# Patient Record
(demographics unavailable — no encounter records)

---

## 2025-01-28 NOTE — PHYSICAL EXAM
[Alert] : alert [Normocephalic] : normocephalic [Flat Open Anterior [place]] : flat open anterior fontanelle [PERRL] : PERRL [Red Reflex Bilateral] : red reflex bilateral [Normally Placed Ears] : normally placed ears [Auricles Well Formed] : auricles well formed [Clear Tympanic membranes] : clear tympanic membranes [Light reflex present] : light reflex present [Bony landmarks visible] : bony landmarks visible [Nares Patent] : nares patent [Palate Intact] : palate intact [Uvula Midline] : uvula midline [Supple, full passive range of motion] : supple, full passive range of motion [Symmetric Chest Rise] : symmetric chest rise [Clear to Auscultation Bilaterally] : clear to auscultation bilaterally [Regular Rate and Rhythm] : regular rate and rhythm [S1, S2 present] : S1, S2 present [+2 Femoral Pulses] : +2 femoral pulses [Soft] : soft [Bowel Sounds] : bowel sounds present [Normal external genitailia] : normal external genitalia [Central Urethral Opening] : central urethral opening [Testicles Descended Bilaterally] : testicles descended bilaterally [Patent] : patent [Normally Placed] : normally placed [No Abnormal Lymph Nodes Palpated] : no abnormal lymph nodes palpated [Symmetric Flexed Extremities] : symmetric flexed extremities [Straight] : straight [Startle Reflex] : startle reflex present [Suck Reflex] : suck reflex present [Rooting] : rooting reflex present [Palmar Grasp] : palmar grasp reflex present [Plantar Grasp] : plantar grasp reflex present [Symmetric [place]] : symmetric [place] [Acute Distress] : no acute distress [Icteric sclera] : nonicteric sclera [Discharge] : no discharge [Palpable Masses] : no palpable masses [Murmurs] : no murmurs [Breast Tissue] : no prominent breast tissue [Tender] : nontender [Distended] : not distended [Hepatomegaly] : no hepatomegaly [Splenomegaly] : no splenomegaly [Mcgowan-Ortolani] : negative Mcgowan-Ortolani [Spinal Dimple] : no spinal dimple [Tuft of Hair] : no tuft of hair [Jaundice] : not jaundice [FreeTextEntry1] : alert NAD, no jaundice [FreeTextEntry2] : NCAT - small about 2 cm mass R upper scalp, with small scab on it.  [FreeTextEntry5] : RR ++  [de-identified] : palate intact [FreeTextEntry8] : RR no murmur [FreeTextEntry9] : soft nl, umb cord dry, clean.  [FreeTextEntry6] : nl penis with wrapping on circ. testes desc bilat [de-identified] : Mcgowan/Ortolani normal, Galeazzi test normal.  Leg length equal, creases symmetrical, no hip click or clunk. No MA or ITT. [de-identified] : peeling skin on abdomen.

## 2025-01-28 NOTE — HISTORY OF PRESENT ILLNESS
[FreeTextEntry1] : 7 day old  parents here BW 2680 g 5-15 DW 2675 g. 55-14  -0.2%. SGA 41 weeks, vaginal delivery.  IOL vertex.  Peds called to [facility] for CAT II tracing - p; 31 yr old mother. . Maternal hx vitiligo, HSV 1 by serum test. Neg SSE.  Mom O+, PNL nl , GBS neg.  Thick meconium. Low tone, no cry on delivery.  Deep suctioned with good cry by 1 MOL. Still low tone.  APGARS 6/7 Nuchal cord x 1, cord around the body x 1. Maternal post partum hemorrhage. Maternal temp post partum 39C. EOS 7.67 At delivery baby boted to have small abdomen with barrel chest. Subcostal retractions. CPAP 12 MOL. Weaned off as sats improved. To NICU on bCPAP 5 25%.  To NICU for resp failure, presumed sepsis. Baby 36.7. Amp/Gent x 2 days. Blood cx neg.  Retained fetal lung fluid, improved with bCPAP. Stable on RA since 25 AM. CXR- reassuring. CBG improvied.  Glucose for SGA normal.  Baby B+, DC positive. Low retic and stable HCT. Bili OK. Beast fed.  Hep B given Circ done.  Mom got the RSV vaccine during the pregnancy.   Screens nl.

## 2025-01-28 NOTE — DISCUSSION/SUMMARY
[Normal Growth] : growth [Normal Development] : developmental [No Elimination Concerns] : elimination [Continue Regimen] : feeding [No Skin Concerns] : skin [Normal Sleep Pattern] : sleep [None] : no known medical problems [Add Food/Vitamin] : add ~M [Anticipatory Guidance Given] : Anticipatory guidance addressed as per the history of present illness section [Hepatitis B In Hospital] : Hepatitis B administered while in the hospital [No Vaccines] : no vaccines needed [No Medications] : ~He/She~ is not on any medications [Parent/Guardian] : Parent/Guardian [FreeTextEntry1] : Well 7 day old. Wt today 5-15, at birthweight. Bili 2.0.  RTO one week.  If any concern about weight or any other issue RTO that day. Call me if questions.  Vit D sample given dry skin , bathing reviewed.  Parents did not want circ wrap removed, were told it would fall on its own. Glans that is visible is pink and looks good.   Anticipatory guidance, safety reviewed in detail.  [place] safety handout given. Safe crib, back sleep. No soft bedding, no pillows, stuffed animals or fluffy items in crib.   Do not overdress. Do not swaddle after 1 mos old. No tight swaddling, do not swaddle legs. Do not put baby on adult bed or sofa, prevention of falls discussed. do not put pillows around baby. Keep in crib, or other safe location. Limit visitors.  No sick visitors.  Keep little kids away from .  Avoid contact with people with cold sores. Fever = rectal temp 100.4 or more, go to ER immediately for fever. If think  is sick, with or without a fever, see a doctor right away. No honey until after age 1 year old.  Feeding discussed in detail. Vitamin D 400 IU per day. Car seat use disc, proper use.  Always keep baby fully buckled when in car seat, whether in car or out of car.  Take out of car seat when home. Watch for head falling forward in car seat. Do not raise head of bed. Do not leave baby in swing or baby seat or car seat unobserved.  Take care that head cannot fall forward and cause trouble breathing. Take baby out and put on back on flat mattress in crib or bassinet when not directly observed. Smoke detector, CO detector, fire extinguisher in the kitchen advised, water temp < 125 F. Never shake a baby.

## 2025-01-28 NOTE — DISCUSSION/SUMMARY
[Normal Growth] : growth [Normal Development] : developmental [No Elimination Concerns] : elimination [Continue Regimen] : feeding [No Skin Concerns] : skin [Normal Sleep Pattern] : sleep [None] : no known medical problems [Add Food/Vitamin] : add ~M [Anticipatory Guidance Given] : Anticipatory guidance addressed as per the history of present illness section [Hepatitis B In Hospital] : Hepatitis B administered while in the hospital [No Vaccines] : no vaccines needed [No Medications] : ~He/She~ is not on any medications [Parent/Guardian] : Parent/Guardian [FreeTextEntry1] : Well 7 day old. Wt today 5-15, at birthweight. Bili 2.0.  RTO one week.  If any concern about weight or any other issue RTO that day. Call me if questions.  Vit D sample given dry skin , bathing reviewed.  Parents did not want circ wrap removed, were told it would fall on its own. Glans that is visible is pink and looks good.   Anticipatory guidance, safety reviewed in detail.  [place] safety handout given. Safe crib, back sleep. No soft bedding, no pillows, stuffed animals or fluffy items in crib.   Do not overdress. Do not swaddle after 1 mos old. No tight swaddling, do not swaddle legs. Do not put baby on adult bed or sofa, prevention of falls discussed. do not put pillows around baby. Keep in crib, or other safe location. Limit visitors.  No sick visitors.  Keep little kids away from .  Avoid contact with people with cold sores. Fever = rectal temp 100.4 or more, go to ER immediately for fever. If think  is sick, with or without a fever, see a doctor right away. No honey until after age 1 year old.  Feeding discussed in detail. Vitamin D 400 IU per day. Car seat use disc, proper use.  Always keep baby fully buckled when in car seat, whether in car or out of car.  Take out of car seat when home. Watch for head falling forward in car seat. Do not raise head of bed. Do not leave baby in swing or baby seat or car seat unobserved.  Take care that head cannot fall forward and cause trouble breathing. Take baby out and put on back on flat mattress in crib or bassinet when not directly observed. Smoke detector, CO detector, fire extinguisher in the kitchen advised, water temp < 125 F. Never shake a baby.

## 2025-01-28 NOTE — PHYSICAL EXAM
[Alert] : alert [Normocephalic] : normocephalic [Flat Open Anterior [place]] : flat open anterior fontanelle [PERRL] : PERRL [Red Reflex Bilateral] : red reflex bilateral [Normally Placed Ears] : normally placed ears [Auricles Well Formed] : auricles well formed [Clear Tympanic membranes] : clear tympanic membranes [Light reflex present] : light reflex present [Bony landmarks visible] : bony landmarks visible [Nares Patent] : nares patent [Palate Intact] : palate intact [Uvula Midline] : uvula midline [Supple, full passive range of motion] : supple, full passive range of motion [Symmetric Chest Rise] : symmetric chest rise [Clear to Auscultation Bilaterally] : clear to auscultation bilaterally [Regular Rate and Rhythm] : regular rate and rhythm [S1, S2 present] : S1, S2 present [+2 Femoral Pulses] : +2 femoral pulses [Soft] : soft [Bowel Sounds] : bowel sounds present [Normal external genitailia] : normal external genitalia [Central Urethral Opening] : central urethral opening [Testicles Descended Bilaterally] : testicles descended bilaterally [Patent] : patent [Normally Placed] : normally placed [No Abnormal Lymph Nodes Palpated] : no abnormal lymph nodes palpated [Symmetric Flexed Extremities] : symmetric flexed extremities [Straight] : straight [Startle Reflex] : startle reflex present [Suck Reflex] : suck reflex present [Rooting] : rooting reflex present [Palmar Grasp] : palmar grasp reflex present [Plantar Grasp] : plantar grasp reflex present [Symmetric [place]] : symmetric [place] [Acute Distress] : no acute distress [Icteric sclera] : nonicteric sclera [Discharge] : no discharge [Palpable Masses] : no palpable masses [Murmurs] : no murmurs [Breast Tissue] : no prominent breast tissue [Tender] : nontender [Distended] : not distended [Hepatomegaly] : no hepatomegaly [Splenomegaly] : no splenomegaly [Mcgowan-Ortolani] : negative Mcgowan-Ortolani [Spinal Dimple] : no spinal dimple [Tuft of Hair] : no tuft of hair [Jaundice] : not jaundice [FreeTextEntry1] : alert NAD, no jaundice [FreeTextEntry2] : NCAT - small about 2 cm mass R upper scalp, with small scab on it.  [FreeTextEntry5] : RR ++  [de-identified] : palate intact [FreeTextEntry8] : RR no murmur [FreeTextEntry9] : soft nl, umb cord dry, clean.  [FreeTextEntry6] : nl penis with wrapping on circ. testes desc bilat [de-identified] : Mcgowan/Ortolani normal, Galeazzi test normal.  Leg length equal, creases symmetrical, no hip click or clunk. No MA or ITT. [de-identified] : peeling skin on abdomen.

## 2025-02-03 NOTE — PHYSICAL EXAM
[Normal External Genitalia] : normal external genitalia [Circumcised] : circumcised [NL] : normotonic [FreeTextEntry1] : NAD no jaundice [FreeTextEntry8] : RR no murmur [FreeTextEntry6] : circ healed, nl [de-identified] : Mcgowan/Ortolani normal, Galeazzi test normal.  Leg length equal, creases symmetrical, no hip click or clunk. No MA or ITT. [de-identified] : peeling layer of skin now only on face

## 2025-02-03 NOTE — PHYSICAL EXAM
[Normal External Genitalia] : normal external genitalia [Circumcised] : circumcised [NL] : normotonic [FreeTextEntry1] : NAD no jaundice [FreeTextEntry8] : RR no murmur [FreeTextEntry6] : circ healed, nl [de-identified] : Mcgowan/Ortolani normal, Galeazzi test normal.  Leg length equal, creases symmetrical, no hip click or clunk. No MA or ITT. [de-identified] : peeling layer of skin now only on face

## 2025-02-03 NOTE — HISTORY OF PRESENT ILLNESS
[FreeTextEntry6] : parents 14 day old recheck.  41 week NICU resp distress. BW 5-15 SGA Doing well now.  Breast fed, eating well.  safe basinet and car seat.

## 2025-02-03 NOTE — DISCUSSION/SUMMARY
[FreeTextEntry1] : Well 14 day old.  TcBili 1 Wt now 6-5, past BW.  Reviewed safety. RTO in 2 weeks if all is going well.  Can come in earlier if needed - call in am for appt.  Parents say seeing people with baby - advised to restrict visitors, reviewed what fever means and what to do. Even a cold can be difficult with newborns, explained.   Anticipatory guidance, safety reviewed in detail.

## 2025-02-19 NOTE — HISTORY OF PRESENT ILLNESS
[Parents] : parents [Breast milk] : breast milk [Normal] : Normal [No] : No cigarette smoke exposure [Water heater temperature set at <120 degrees F] : Water heater temperature set at <120 degrees F [Rear facing car seat in back seat] : Rear facing car seat in back seat [Carbon Monoxide Detectors] : Carbon monoxide detectors at home [Smoke Detectors] : Smoke detectors at home. [At risk for exposure to TB] : Not at risk for exposure to Tuberculosis  [FreeTextEntry1] : One mos old.  Not a breech birth.  Breast  milk - only, rare formula. One hour each side nursing sometimes. Gets 4-120 ml pumping.  Formula - rarely, said once a day, then not being given. Vit D given Safe bassinet, safe CS use.   parents here

## 2025-02-19 NOTE — PHYSICAL EXAM
[Alert] : alert [Acute Distress] : no acute distress [Normocephalic] : normocephalic [Flat Open Anterior [place]] : flat open anterior fontanelle [PERRL] : PERRL [Red Reflex Bilateral] : red reflex bilateral [Normally Placed Ears] : normally placed ears [Auricles Well Formed] : auricles well formed [Clear Tympanic membranes] : clear tympanic membranes [Light reflex present] : light reflex present [Bony landmarks visible] : bony landmarks visible [Discharge] : no discharge [Nares Patent] : nares patent [Palate Intact] : palate intact [Uvula Midline] : uvula midline [Supple, full passive range of motion] : supple, full passive range of motion [Palpable Masses] : no palpable masses [Symmetric Chest Rise] : symmetric chest rise [Clear to Auscultation Bilaterally] : clear to auscultation bilaterally [Regular Rate and Rhythm] : regular rate and rhythm [S1, S2 present] : S1, S2 present [Murmurs] : no murmurs [+2 Femoral Pulses] : +2 femoral pulses [Breast Tissue] : no prominent breast tissue [Soft] : soft [Tender] : nontender [Distended] : not distended [Bowel Sounds] : bowel sounds present [Hepatomegaly] : no hepatomegaly [Splenomegaly] : no splenomegaly [Normal external genitailia] : normal external genitalia [Central Urethral Opening] : central urethral opening [Testicles Descended Bilaterally] : testicles descended bilaterally [Normally Placed] : normally placed [No Abnormal Lymph Nodes Palpated] : no abnormal lymph nodes palpated [Mcgowan-Ortolani] : negative Mcgowan-Ortolani [Symmetric Flexed Extremities] : symmetric flexed extremities [Spinal Dimple] : no spinal dimple [Tuft of Hair] : no tuft of hair [Startle Reflex] : startle reflex present [Suck Reflex] : suck reflex present [Rooting] : rooting reflex present [Palmar Grasp] : palmar grasp reflex present [Plantar Grasp] : plantar grasp reflex present [Symmetric [place]] : symmetric [place] [Jaundice] : no jaundice [Rash and/or lesion present] : no rash/lesion [FreeTextEntry1] : alert NAD [FreeTextEntry5] : RR++ [FreeTextEntry8] : RR no murmur [FreeTextEntry9] : soft NT ND nl [FreeTextEntry6] : nl  [de-identified] : Mcgowan/Ortolani normal, Galeazzi test normal.  Leg length equal, creases symmetrical, no hip click or clunk. No MA or ITT. [de-identified] : nl

## 2025-02-19 NOTE — DISCUSSION/SUMMARY
[Normal Growth] : growth [Normal Development] : development  [No Elimination Concerns] : elimination [Continue Regimen] : feeding [No Skin Concerns] : skin [Normal Sleep Pattern] : sleep [None] : no medical problems [Anticipatory Guidance Given] : Anticipatory guidance addressed as per the history of present illness section [No Medications] : ~He/She~ is not on any medications [Parent/Guardian] : Parent/Guardian [FreeTextEntry1] : Well baby Ht 1% Wt 4% wt for ht good, HC good.  Discussed feeding in detail with mother. Advised nurse first, 20 mins on each side, alternate beginning sides. After feed offer formula, gave samples Similac, explained how to mix. See how much takes after nursing. give as much as wants. Encouraged to continue nursing, this is to see if needs supplementation.  RTO one mos. Earlier if want a weight or has any concerns.   - [place] score 7, depression unlikely. But mother teary on discussion. Father not helping enough she says. Disc with father, help more att his time,  Nursing less and making more of a nursing schedule q 3 hrs, supplementing, can let sleep 6 hrs at night if does - may help mom feel better.  Gave her [place] post partum phone,and postpartum[place]. phone for [facility] free calling line for help. Advised mom self care, call me or return to speak to me if needs more help with mood.  Explained and reassured mother.   Did not given hep B as one day too soon.   Anticipatory guidance, safety reviewed in detail.  Safe crib, back sleep. No soft bedding, no pillows, stuffed animals or fluffy items in crib.   Do not overdress. Do not swaddle after 1 mos old. No tight swaddling, do not swaddle legs. Do not put baby on adult bed or sofa, prevention of falls discussed. do not put pillows around baby. Keep in crib, or other safe location. Limit visitors.  No sick visitors.  Keep little kids away from .  Avoid contact with people with cold sores. Fever = rectal temp 100.4 or more, go to ER immediately for fever. If think  is sick, with or without a fever, see a doctor right away. No honey until after age 1 year old.  Feeding discussed in detail. Vitamin D 400 IU per day. Car seat use disc, proper use.  Always keep baby fully buckled when in car seat, whether in car or out of car.  Take out of car seat when home. Watch for head falling forward in car seat. Do not raise head of bed. Do not leave baby in swing or baby seat or car seat unobserved.  Take care that head cannot fall forward and cause trouble breathing. Take baby out and put on back on flat mattress in crib or bassinet when not directly observed. Smoke detector, CO detector, fire extinguisher in the kitchen advised, water temp < 125 F. Never shake a baby.

## 2025-02-19 NOTE — DISCUSSION/SUMMARY
[Normal Growth] : growth [Normal Development] : development  [No Elimination Concerns] : elimination [Continue Regimen] : feeding [No Skin Concerns] : skin [Normal Sleep Pattern] : sleep [None] : no medical problems [Anticipatory Guidance Given] : Anticipatory guidance addressed as per the history of present illness section [No Medications] : ~He/She~ is not on any medications [Parent/Guardian] : Parent/Guardian [FreeTextEntry1] : Well baby Ht 1% Wt 4% wt for ht good, HC good.  Discussed feeding in detail with mother. Advised nurse first, 20 mins on each side, alternate beginning sides. After feed offer formula, gave samples Similac, explained how to mix. See how much takes after nursing. give as much as wants. Encouraged to continue nursing, this is to see if needs supplementation.  RTO one mos. Earlier if want a weight or has any concerns.   - [place] score 7, depression unlikely. But mother teary on discussion. Father not helping enough she says. Disc with father, help more att his time,  Nursing less and making more of a nursing schedule q 3 hrs, supplementing, can let sleep 6 hrs at night if does - may help mom feel better.  Gave her [place] post partum phone,and [place]. phone for [facility] free calling line for help. Advised mom self care, call me or return to speak to me if needs more help with mood.  Explained and reassured mother.   Did not given hep B as one day too soon.   Anticipatory guidance, safety reviewed in detail.  Safe crib, back sleep. No soft bedding, no pillows, stuffed animals or fluffy items in crib.   Do not overdress. Do not swaddle after 1 mos old. No tight swaddling, do not swaddle legs. Do not put baby on adult bed or sofa, prevention of falls discussed. do not put pillows around baby. Keep in crib, or other safe location. Limit visitors.  No sick visitors.  Keep little kids away from .  Avoid contact with people with cold sores. Fever = rectal temp 100.4 or more, go to ER immediately for fever. If think  is sick, with or without a fever, see a doctor right away. No honey until after age 1 year old.  Feeding discussed in detail. Vitamin D 400 IU per day. Car seat use disc, proper use.  Always keep baby fully buckled when in car seat, whether in car or out of car.  Take out of car seat when home. Watch for head falling forward in car seat. Do not raise head of bed. Do not leave baby in swing or baby seat or car seat unobserved.  Take care that head cannot fall forward and cause trouble breathing. Take baby out and put on back on flat mattress in crib or bassinet when not directly observed. Smoke detector, CO detector, fire extinguisher in the kitchen advised, water temp < 125 F. Never shake a baby.

## 2025-02-19 NOTE — PHYSICAL EXAM
[Alert] : alert [Acute Distress] : no acute distress [Normocephalic] : normocephalic [Flat Open Anterior [place]] : flat open anterior fontanelle [PERRL] : PERRL [Red Reflex Bilateral] : red reflex bilateral [Normally Placed Ears] : normally placed ears [Auricles Well Formed] : auricles well formed [Clear Tympanic membranes] : clear tympanic membranes [Light reflex present] : light reflex present [Bony landmarks visible] : bony landmarks visible [Discharge] : no discharge [Nares Patent] : nares patent [Palate Intact] : palate intact [Uvula Midline] : uvula midline [Supple, full passive range of motion] : supple, full passive range of motion [Palpable Masses] : no palpable masses [Symmetric Chest Rise] : symmetric chest rise [Clear to Auscultation Bilaterally] : clear to auscultation bilaterally [Regular Rate and Rhythm] : regular rate and rhythm [S1, S2 present] : S1, S2 present [Murmurs] : no murmurs [+2 Femoral Pulses] : +2 femoral pulses [Breast Tissue] : no prominent breast tissue [Soft] : soft [Tender] : nontender [Distended] : not distended [Bowel Sounds] : bowel sounds present [Hepatomegaly] : no hepatomegaly [Splenomegaly] : no splenomegaly [Normal external genitailia] : normal external genitalia [Central Urethral Opening] : central urethral opening [Testicles Descended Bilaterally] : testicles descended bilaterally [Normally Placed] : normally placed [No Abnormal Lymph Nodes Palpated] : no abnormal lymph nodes palpated [Mcgowan-Ortolani] : negative Mcgowan-Ortolani [Symmetric Flexed Extremities] : symmetric flexed extremities [Spinal Dimple] : no spinal dimple [Tuft of Hair] : no tuft of hair [Startle Reflex] : startle reflex present [Suck Reflex] : suck reflex present [Rooting] : rooting reflex present [Palmar Grasp] : palmar grasp reflex present [Plantar Grasp] : plantar grasp reflex present [Symmetric [place]] : symmetric [place] [Jaundice] : no jaundice [Rash and/or lesion present] : no rash/lesion [FreeTextEntry1] : alert NAD [FreeTextEntry5] : RR++ [FreeTextEntry8] : RR no murmur [FreeTextEntry9] : soft NT ND nl [FreeTextEntry6] : nl  [de-identified] : Mcgowan/Ortolani normal, Galeazzi test normal.  Leg length equal, creases symmetrical, no hip click or clunk. No MA or ITT. [de-identified] : nl